# Patient Record
Sex: MALE
[De-identification: names, ages, dates, MRNs, and addresses within clinical notes are randomized per-mention and may not be internally consistent; named-entity substitution may affect disease eponyms.]

---

## 2024-09-03 ENCOUNTER — APPOINTMENT (OUTPATIENT)
Dept: ELECTROPHYSIOLOGY | Facility: CLINIC | Age: 59
End: 2024-09-03
Payer: COMMERCIAL

## 2024-09-03 VITALS
SYSTOLIC BLOOD PRESSURE: 127 MMHG | WEIGHT: 222 LBS | HEART RATE: 73 BPM | DIASTOLIC BLOOD PRESSURE: 83 MMHG | OXYGEN SATURATION: 97 % | BODY MASS INDEX: 31.08 KG/M2 | HEIGHT: 71 IN

## 2024-09-03 DIAGNOSIS — Z78.9 OTHER SPECIFIED HEALTH STATUS: ICD-10-CM

## 2024-09-03 DIAGNOSIS — Z82.49 FAMILY HISTORY OF ISCHEMIC HEART DISEASE AND OTHER DISEASES OF THE CIRCULATORY SYSTEM: ICD-10-CM

## 2024-09-03 DIAGNOSIS — Z00.00 ENCOUNTER FOR GENERAL ADULT MEDICAL EXAMINATION W/OUT ABNORMAL FINDINGS: ICD-10-CM

## 2024-09-03 DIAGNOSIS — I48.91 UNSPECIFIED ATRIAL FIBRILLATION: ICD-10-CM

## 2024-09-03 PROCEDURE — 99205 OFFICE O/P NEW HI 60 MIN: CPT | Mod: 25

## 2024-09-03 PROCEDURE — 93000 ELECTROCARDIOGRAM COMPLETE: CPT

## 2024-09-03 PROCEDURE — G2211 COMPLEX E/M VISIT ADD ON: CPT | Mod: NC

## 2024-09-03 RX ORDER — KRILL/OM-3/DHA/EPA/PHOSPHO/AST 1000-230MG
81 CAPSULE ORAL
Refills: 0 | Status: ACTIVE | COMMUNITY

## 2024-09-03 RX ORDER — CHLORTHALIDONE 50 MG/1
TABLET ORAL DAILY
Refills: 0 | Status: ACTIVE | COMMUNITY

## 2024-09-03 NOTE — CARDIOLOGY SUMMARY
[de-identified] : 9/3/2024 AFib with cont VR (HR 75 bpm), QTc 413 msec [de-identified] : per pt Ca score 15

## 2024-09-03 NOTE — DISCUSSION/SUMMARY
[EKG obtained to assist in diagnosis and management of assessed problem(s)] : EKG obtained to assist in diagnosis and management of assessed problem(s) [FreeTextEntry1] : I discussed with patient the different management strategies of atrial fibrillation including rate control, rhythm control and ablative therapy. I also discussed with the patient in great length the role of anticoagulation in stroke prevention. Patient expressed understanding of the discussion. Patient is interested in restoring sinus rhythm through DC cardioversion.   I explained that he will need a RORO prior to cardioversion to rule out thrombus. I discussed risks and benefits of DC cardioversion including risk of aspiration, skin burn, stroke, and cardiac arrest. He expressed understanding and would like to proceed with cardioversion. My office staff will contact patient the patient with date, time and instructions.

## 2024-09-03 NOTE — HISTORY OF PRESENT ILLNESS
[FreeTextEntry1] : Referring: Dr. Franz  57 yo M with no sig past medical history referred for evaluation of new onset AFib (detected incidentally). Patient states he has been under significant stress at home the last few months. He has two boys, one on the spectrum and the other with autism. He denies chest pain, dyspnea, palpitations, dizziness, lightheadedness, presyncope or syncope.

## 2024-09-03 NOTE — ASSESSMENT
[FreeTextEntry1] : # AFib - Currently on aspirin 81 mg for CHADS VASc 0 - Denies any symptoms but does admit to anxiety and is unsure if that can be from the heart. - Rec RORO/CV. Understands he will need 30 days of OAC after cardioversion.  - Advised pt to stop all herbal supplements including turmeric and saw palmetto - Recent labs reviewed. Electrolytes, hemoglobin, TFTs and LFTs stable - Obtain CCTA results and echo  I have also advised the patient to go to the nearest emergency room if he experiences any chest pain, dyspnea, syncope, or has any other compelling symptoms.  Follow up in 1 mo after cardioversion Plan discussed with Dr. Franz

## 2024-09-03 NOTE — PHYSICAL EXAM
[Well Developed] : well developed [Well Nourished] : well nourished [No Acute Distress] : no acute distress [Obese] : obese [Normal Conjunctiva] : normal conjunctiva [Normal S1, S2] : normal S1, S2 [No Murmur] : no murmur [Clear Lung Fields] : clear lung fields [Good Air Entry] : good air entry [No Respiratory Distress] : no respiratory distress  [Soft] : abdomen soft [Normal Gait] : normal gait [No Edema] : no edema [Moves all extremities] : moves all extremities [Normal Speech] : normal speech [Alert and Oriented] : alert and oriented [Normal memory] : normal memory [de-identified] : irregularly irregular rate and rhythm

## 2024-09-04 PROBLEM — I48.91 ATRIAL FIBRILLATION, UNSPECIFIED TYPE: Status: ACTIVE | Noted: 2024-09-03

## 2024-09-10 RX ORDER — ASPIRIN 81 MG
0 TABLET, DELAYED RELEASE (ENTERIC COATED) ORAL
Refills: 0 | DISCHARGE

## 2024-09-12 ENCOUNTER — RESULT REVIEW (OUTPATIENT)
Age: 59
End: 2024-09-12

## 2024-09-12 ENCOUNTER — OUTPATIENT (OUTPATIENT)
Dept: OUTPATIENT SERVICES | Facility: HOSPITAL | Age: 59
LOS: 1 days | Discharge: HOME | End: 2024-09-12
Payer: COMMERCIAL

## 2024-09-12 ENCOUNTER — NON-APPOINTMENT (OUTPATIENT)
Age: 59
End: 2024-09-12

## 2024-09-12 DIAGNOSIS — I48.19 OTHER PERSISTENT ATRIAL FIBRILLATION: ICD-10-CM

## 2024-09-12 DIAGNOSIS — I48.91 UNSPECIFIED ATRIAL FIBRILLATION: ICD-10-CM

## 2024-09-12 PROCEDURE — 93320 DOPPLER ECHO COMPLETE: CPT | Mod: 26

## 2024-09-12 PROCEDURE — 93320 DOPPLER ECHO COMPLETE: CPT

## 2024-09-12 PROCEDURE — 92960 CARDIOVERSION ELECTRIC EXT: CPT

## 2024-09-12 PROCEDURE — 93325 DOPPLER ECHO COLOR FLOW MAPG: CPT

## 2024-09-12 PROCEDURE — 93312 ECHO TRANSESOPHAGEAL: CPT

## 2024-09-12 PROCEDURE — 93312 ECHO TRANSESOPHAGEAL: CPT | Mod: 26,XU

## 2024-09-12 PROCEDURE — 93325 DOPPLER ECHO COLOR FLOW MAPG: CPT | Mod: 26

## 2024-09-12 RX ORDER — APIXABAN 5 MG/1
1 TABLET, FILM COATED ORAL
Qty: 60 | Refills: 0
Start: 2024-09-12

## 2024-09-12 RX ORDER — ENOXAPARIN SODIUM 100 MG/ML
100 INJECTION SUBCUTANEOUS ONCE
Refills: 0 | Status: COMPLETED | OUTPATIENT
Start: 2024-09-12 | End: 2024-09-12

## 2024-09-12 RX ADMIN — ENOXAPARIN SODIUM 100 MILLIGRAM(S): 100 INJECTION SUBCUTANEOUS at 10:50

## 2024-09-12 NOTE — ASU PATIENT PROFILE, ADULT - FALL HARM RISK - UNIVERSAL INTERVENTIONS
Bed in lowest position, wheels locked, appropriate side rails in place/Call bell, personal items and telephone in reach/Instruct patient to call for assistance before getting out of bed or chair/Non-slip footwear when patient is out of bed/[place] to call system/Physically safe environment - no spills, clutter or unnecessary equipment/Purposeful Proactive Rounding/Room/bathroom lighting operational, light cord in reach

## 2024-09-12 NOTE — H&P CARDIOLOGY - HISTORY OF PRESENT ILLNESS
57 yo M with no sig past medical history referred for evaluation of new onset AFib (detected incidentally). Patient states he has been under significant stress at home the last few months. He has two boys, one on the spectrum and the other with autism. He denies chest pain, dyspnea, palpitations, dizziness, lightheadedness, presyncope or syncope.     He has a CACS of 15 but no obstructive disease. No MI  No diabetes (5.7%)     CHADSVASC is 0. Is on aspirin 81mg daily.     He is not on AC but was told he has to be on AC after cardioversion.  59 yo M with no sig past medical history referred for evaluation of new onset AFib (detected incidentally). Patient states he has been under significant stress at home the last few months. He has two boys, one on the spectrum and the other with autism. He denies chest pain, dyspnea, palpitations, dizziness, lightheadedness, presyncope or syncope.     He has a CACS of 15 but no obstructive disease. No MI  No diabetes (5.7%)     CHADSVASC is 0. Is on aspirin 81mg daily.     He is not on AC but was told he has to be on AC after cardioversion.   He will be given 100mg of Lovenox before the procedure (he weighs 220 lbs and Creatinine 1.23 GFR is 69) and Eliquis 5mg BID will be sent to his pharmacy

## 2024-09-12 NOTE — H&P CARDIOLOGY - BIRTH SEX
Pharmacy sending fax for refill on:    atorvastatin (LIPITOR) 80 MG tablet  Take 1 tablet by mouth daily.    [facility] [place]      Male

## 2024-09-12 NOTE — CHART NOTE - NSCHARTNOTEFT_GEN_A_CORE
INDICATION:  - A fib     IMPRESSION:  - No NINI thrombus. Successful cardioversion to NSR.    - Normal velocities   - Normal LVEF     PROCEDURE: Transesophageal echocardiogram    Primary Physician: Dr. Richards  Assistant: Dr. Randolph    ANESTHESIA TYPE:   - As documented by anesthesia     CONDITION:  [  ] Good    ESTIMATED BLOOD LOSS: None    COMPLICATIONS: None    FINDINGS:    After risks and benefits of procedures were explained, informed consent was obtained and placed in chart. Refer to Anesthesia note for sedation details.  The RORO probe was passed into the esophagus without difficulty.  Transesophageal images were obtained.  The RORO probe was removed without difficulty and examined.  There was no evidence for bleeding.  The patient tolerated the procedure well without any immediate RORO-related complications.      Preliminary Findings:  LA: Normal.   NINI: Left atrial appendage was clear of clot and smoke.  LV: LVEF was estimated at 60%.   MV: Trace MR, no evidence of MS.   AV: No evidence of AI, normal opening.   RA: Normal  RV: Normal   TV: Trace TR.   PV: no PI.   IAS: no PFO. No R-> L shunt by color flow doppler.   Pericardial Effusion: None  Aorta: There was no atheroma seen in the thoracic aorta.     Patient successfully converted to sinus rhythm with synchronized 200J of direct current cardioversion. INDICATION:    - A fib     IMPRESSION:  - No NINI thrombus. Successful cardioversion to NSR.    - Normal velocities   - Normal LVEF     PROCEDURE: Transesophageal echocardiogram    Primary Physician: Dr. Richards  Assistant: Dr. Randolph    ANESTHESIA TYPE:   - As documented by anesthesia     CONDITION:  [  ] Good    ESTIMATED BLOOD LOSS: None    COMPLICATIONS: None    FINDINGS:    After risks and benefits of procedures were explained, informed consent was obtained and placed in chart. Refer to Anesthesia note for sedation details.  The RORO probe was passed into the esophagus without difficulty.  Transesophageal images were obtained.  The RORO probe was removed without difficulty and examined.  There was no evidence for bleeding.  The patient tolerated the procedure well without any immediate RORO-related complications.      Preliminary Findings:  LA: Normal.   NINI: Left atrial appendage was clear of clot and smoke.  LV: LVEF was estimated at 60%.   MV: Trace MR, no evidence of MS.   AV: No evidence of AI, normal opening.   RA: Normal  RV: Normal   TV: Trace TR.   PV: no PI.   IAS: no PFO. No R-> L shunt by color flow doppler.   Pericardial Effusion: None  Aorta: There was no atheroma seen in the thoracic aorta.     Patient successfully converted to sinus rhythm with synchronized 360J of direct current cardioversion.

## 2024-09-12 NOTE — PRE-ANESTHESIA EVALUATION ADULT - NSPROPOSEDPROCEDFT_GEN_ALL_CORE
What Type Of Note Output Would You Prefer (Optional)?: Standard Output
On A Scale Of 0 To 10 How Would You Rate Your Itching?: 7
How Did Your Itching Occur?: gradual in onset  (over a period of years)
How Severe Is Your Itching?: mild
RORO DCCV

## 2024-10-01 ENCOUNTER — APPOINTMENT (OUTPATIENT)
Dept: ELECTROPHYSIOLOGY | Facility: CLINIC | Age: 59
End: 2024-10-01
Payer: COMMERCIAL

## 2024-10-01 VITALS
HEART RATE: 84 BPM | WEIGHT: 222 LBS | BODY MASS INDEX: 31.08 KG/M2 | SYSTOLIC BLOOD PRESSURE: 140 MMHG | HEIGHT: 71 IN | DIASTOLIC BLOOD PRESSURE: 90 MMHG

## 2024-10-01 DIAGNOSIS — I48.91 UNSPECIFIED ATRIAL FIBRILLATION: ICD-10-CM

## 2024-10-01 PROCEDURE — G2211 COMPLEX E/M VISIT ADD ON: CPT | Mod: NC

## 2024-10-01 PROCEDURE — 93000 ELECTROCARDIOGRAM COMPLETE: CPT

## 2024-10-01 PROCEDURE — 99215 OFFICE O/P EST HI 40 MIN: CPT

## 2024-10-01 NOTE — ASSESSMENT
[FreeTextEntry1] : # Paroxysmal AFib - s/p RORO/CV 9/12/2024 with recurrent of AF - Currently on Eliquis 5 mg PO BID after recent cardioversion for 30 days. CHADS VASc 0 - Denies any symptoms but does admit to anxiety and is unsure if that can be from the heart. - Advised pt to stop all herbal supplements including turmeric and saw palmetto - Recent labs reviewed. Electrolytes, hemoglobin, TFTs and LFTs stable - Discussed various AF management strategies including DCCV, AAD, Ablation. Patient wishes to avoid AAD and would like to proceed with ablation.  - Going for RAYMUNDO eval in the next few weeks   I have also advised the patient to go to the nearest emergency room if he experiences any chest pain, dyspnea, syncope, or has any other compelling symptoms.  Plan discussed with Dr. Franz Follow up 2-3 weeks after ablation.

## 2024-10-01 NOTE — CARDIOLOGY SUMMARY
[de-identified] : 10/1/2024 AFib with cont VR (HR 84 bpm), iRBBB, QTc 444 msec 9/3/2024 AFib with cont VR (HR 75 bpm), QTc 413 msec [de-identified] : MPI normal per Dr. Franz [de-identified] : TTE 8/22/2024 EF 56%. LAE. Mild MR>  RORO/CV 9/12/2024 EF 55-60%. Normal biatrial size. Trace MR. No NINI thrombus and normal NINI velocities. Successful cardioversion with 360 J.  [de-identified] : 8/23/2024 Ca score 11.59.

## 2024-10-01 NOTE — HISTORY OF PRESENT ILLNESS
[FreeTextEntry1] : Referring: Dr. Franz  58 yo M with no sig past medical history referred for evaluation of new onset AFib (detected incidentally). Patient states he has been under significant stress at home the last few months. He has two boys, one on the spectrum and the other with autism. He denies chest pain, dyspnea, palpitations, dizziness, lightheadedness, presyncope or syncope.    10/1/2024 Here for follow up after cardioversion. He believes he felt better after cardioversion but cannot elaborate. He has cut down on caffeine intake. Denies chest pain, dizziness, lightheadedness, presyncope or syncope. Believes occ flutters may have been from anxiety but not sure. Wife states pt has been more easily irritable in the last 8 mo.

## 2024-10-01 NOTE — DISCUSSION/SUMMARY
[FreeTextEntry1] : We had an extensive conversation regarding the nature of atrial fibrillation, including potential etiologies, underlying pathophysiology and natural history of the disease. In addition, the potential risk of thromboembolic events and assessment of that risk were discussed. I have emphasized the importance of continuing anticoagulation.   In addition, the maintenance of sinus rhythm along with adjuvant antiarrhythmic agents and catheter ablation therapy were discussed. The rationale for and risks of ablation therapy were discussed, including but not limited to bleeding, vascular injury, groin complications, cardiac perforation and tamponade, stroke, esophageal injury, pulmonary vein stenosis, need for pacemaker, need for cardiac surgery, and death. In addition, the long-term and ongoing nature of this therapy were also discussed, including the critical role of continued monitoring post-ablation and the potential for the necessity of repeat ablation procedures to definitively treat the condition.   The patient verbalized understanding of the discussion and all questions were addressed and answered. The patient would like to proceed with ablation. [EKG obtained to assist in diagnosis and management of assessed problem(s)] : EKG obtained to assist in diagnosis and management of assessed problem(s)

## 2024-10-11 RX ORDER — APIXABAN 5 MG/1
5 TABLET, FILM COATED ORAL
Qty: 180 | Refills: 2 | Status: ACTIVE | COMMUNITY
Start: 1900-01-01 | End: 1900-01-01

## 2024-10-14 ENCOUNTER — APPOINTMENT (OUTPATIENT)
Dept: CARDIOLOGY | Facility: CLINIC | Age: 59
End: 2024-10-14

## 2024-10-14 VITALS
WEIGHT: 220 LBS | BODY MASS INDEX: 30.8 KG/M2 | SYSTOLIC BLOOD PRESSURE: 128 MMHG | HEART RATE: 90 BPM | HEIGHT: 71 IN | DIASTOLIC BLOOD PRESSURE: 88 MMHG

## 2024-10-14 PROCEDURE — 99204 OFFICE O/P NEW MOD 45 MIN: CPT

## 2024-10-28 PROBLEM — Z71.3 DIETARY COUNSELING: Status: ACTIVE | Noted: 2024-10-28

## 2024-10-28 PROBLEM — Z71.82 EXERCISE COUNSELING: Status: ACTIVE | Noted: 2024-10-28

## 2024-10-28 PROBLEM — Z71.89 COUNSELING ON HEALTH PROMOTION AND DISEASE PREVENTION: Status: ACTIVE | Noted: 2024-10-28

## 2024-11-04 ENCOUNTER — APPOINTMENT (OUTPATIENT)
Dept: SLEEP CENTER | Facility: HOSPITAL | Age: 59
End: 2024-11-04
Payer: COMMERCIAL

## 2024-11-04 ENCOUNTER — OUTPATIENT (OUTPATIENT)
Dept: OUTPATIENT SERVICES | Facility: HOSPITAL | Age: 59
LOS: 1 days | Discharge: HOME | End: 2024-11-04
Payer: COMMERCIAL

## 2024-11-04 PROCEDURE — 95800 SLP STDY UNATTENDED: CPT | Mod: 26

## 2024-11-04 PROCEDURE — 95800 SLP STDY UNATTENDED: CPT

## 2024-11-08 DIAGNOSIS — G47.33 OBSTRUCTIVE SLEEP APNEA (ADULT) (PEDIATRIC): ICD-10-CM

## 2024-11-09 DIAGNOSIS — G47.33 OBSTRUCTIVE SLEEP APNEA (ADULT) (PEDIATRIC): ICD-10-CM

## 2024-11-21 ENCOUNTER — RESULT REVIEW (OUTPATIENT)
Age: 59
End: 2024-11-21

## 2024-11-21 ENCOUNTER — OUTPATIENT (OUTPATIENT)
Dept: OUTPATIENT SERVICES | Facility: HOSPITAL | Age: 59
LOS: 1 days | End: 2024-11-21
Payer: COMMERCIAL

## 2024-11-21 VITALS
SYSTOLIC BLOOD PRESSURE: 146 MMHG | OXYGEN SATURATION: 100 % | RESPIRATION RATE: 18 BRPM | HEART RATE: 90 BPM | TEMPERATURE: 98 F | DIASTOLIC BLOOD PRESSURE: 90 MMHG | WEIGHT: 220.46 LBS | HEIGHT: 71 IN

## 2024-11-21 DIAGNOSIS — Z98.890 OTHER SPECIFIED POSTPROCEDURAL STATES: Chronic | ICD-10-CM

## 2024-11-21 DIAGNOSIS — Z90.89 ACQUIRED ABSENCE OF OTHER ORGANS: Chronic | ICD-10-CM

## 2024-11-21 DIAGNOSIS — Z01.818 ENCOUNTER FOR OTHER PREPROCEDURAL EXAMINATION: ICD-10-CM

## 2024-11-21 DIAGNOSIS — I48.19 OTHER PERSISTENT ATRIAL FIBRILLATION: ICD-10-CM

## 2024-11-21 LAB
ALBUMIN SERPL ELPH-MCNC: 4.2 G/DL — SIGNIFICANT CHANGE UP (ref 3.5–5.2)
ALP SERPL-CCNC: 72 U/L — SIGNIFICANT CHANGE UP (ref 30–115)
ALT FLD-CCNC: 23 U/L — SIGNIFICANT CHANGE UP (ref 0–41)
ANION GAP SERPL CALC-SCNC: 10 MMOL/L — SIGNIFICANT CHANGE UP (ref 7–14)
AST SERPL-CCNC: 19 U/L — SIGNIFICANT CHANGE UP (ref 0–41)
BASOPHILS # BLD AUTO: 0.06 K/UL — SIGNIFICANT CHANGE UP (ref 0–0.2)
BASOPHILS NFR BLD AUTO: 0.9 % — SIGNIFICANT CHANGE UP (ref 0–1)
BILIRUB SERPL-MCNC: 0.2 MG/DL — SIGNIFICANT CHANGE UP (ref 0.2–1.2)
BLD GP AB SCN SERPL QL: SIGNIFICANT CHANGE UP
BUN SERPL-MCNC: 18 MG/DL — SIGNIFICANT CHANGE UP (ref 10–20)
CALCIUM SERPL-MCNC: 9.1 MG/DL — SIGNIFICANT CHANGE UP (ref 8.4–10.5)
CHLORIDE SERPL-SCNC: 101 MMOL/L — SIGNIFICANT CHANGE UP (ref 98–110)
CO2 SERPL-SCNC: 27 MMOL/L — SIGNIFICANT CHANGE UP (ref 17–32)
CREAT SERPL-MCNC: 1 MG/DL — SIGNIFICANT CHANGE UP (ref 0.7–1.5)
EGFR: 88 ML/MIN/1.73M2 — SIGNIFICANT CHANGE UP
EOSINOPHIL # BLD AUTO: 0.16 K/UL — SIGNIFICANT CHANGE UP (ref 0–0.7)
EOSINOPHIL NFR BLD AUTO: 2.3 % — SIGNIFICANT CHANGE UP (ref 0–8)
GLUCOSE SERPL-MCNC: 92 MG/DL — SIGNIFICANT CHANGE UP (ref 70–99)
HCT VFR BLD CALC: 43.3 % — SIGNIFICANT CHANGE UP (ref 42–52)
HGB BLD-MCNC: 14.5 G/DL — SIGNIFICANT CHANGE UP (ref 14–18)
IMM GRANULOCYTES NFR BLD AUTO: 0.3 % — SIGNIFICANT CHANGE UP (ref 0.1–0.3)
LYMPHOCYTES # BLD AUTO: 1.5 K/UL — SIGNIFICANT CHANGE UP (ref 1.2–3.4)
LYMPHOCYTES # BLD AUTO: 21.3 % — SIGNIFICANT CHANGE UP (ref 20.5–51.1)
MCHC RBC-ENTMCNC: 30.5 PG — SIGNIFICANT CHANGE UP (ref 27–31)
MCHC RBC-ENTMCNC: 33.5 G/DL — SIGNIFICANT CHANGE UP (ref 32–37)
MCV RBC AUTO: 91.2 FL — SIGNIFICANT CHANGE UP (ref 80–94)
MONOCYTES # BLD AUTO: 0.57 K/UL — SIGNIFICANT CHANGE UP (ref 0.1–0.6)
MONOCYTES NFR BLD AUTO: 8.1 % — SIGNIFICANT CHANGE UP (ref 1.7–9.3)
NEUTROPHILS # BLD AUTO: 4.73 K/UL — SIGNIFICANT CHANGE UP (ref 1.4–6.5)
NEUTROPHILS NFR BLD AUTO: 67.1 % — SIGNIFICANT CHANGE UP (ref 42.2–75.2)
NRBC # BLD: 0 /100 WBCS — SIGNIFICANT CHANGE UP (ref 0–0)
PLATELET # BLD AUTO: 328 K/UL — SIGNIFICANT CHANGE UP (ref 130–400)
PMV BLD: 10.2 FL — SIGNIFICANT CHANGE UP (ref 7.4–10.4)
POTASSIUM SERPL-MCNC: 4.1 MMOL/L — SIGNIFICANT CHANGE UP (ref 3.5–5)
POTASSIUM SERPL-SCNC: 4.1 MMOL/L — SIGNIFICANT CHANGE UP (ref 3.5–5)
PROT SERPL-MCNC: 6.3 G/DL — SIGNIFICANT CHANGE UP (ref 6–8)
RBC # BLD: 4.75 M/UL — SIGNIFICANT CHANGE UP (ref 4.7–6.1)
RBC # FLD: 13.1 % — SIGNIFICANT CHANGE UP (ref 11.5–14.5)
SODIUM SERPL-SCNC: 138 MMOL/L — SIGNIFICANT CHANGE UP (ref 135–146)
WBC # BLD: 7.04 K/UL — SIGNIFICANT CHANGE UP (ref 4.8–10.8)
WBC # FLD AUTO: 7.04 K/UL — SIGNIFICANT CHANGE UP (ref 4.8–10.8)

## 2024-11-21 PROCEDURE — 85025 COMPLETE CBC W/AUTO DIFF WBC: CPT

## 2024-11-21 PROCEDURE — 36415 COLL VENOUS BLD VENIPUNCTURE: CPT

## 2024-11-21 PROCEDURE — 71046 X-RAY EXAM CHEST 2 VIEWS: CPT | Mod: 26

## 2024-11-21 PROCEDURE — 93010 ELECTROCARDIOGRAM REPORT: CPT

## 2024-11-21 PROCEDURE — 93005 ELECTROCARDIOGRAM TRACING: CPT

## 2024-11-21 PROCEDURE — 86900 BLOOD TYPING SEROLOGIC ABO: CPT

## 2024-11-21 PROCEDURE — 86901 BLOOD TYPING SEROLOGIC RH(D): CPT

## 2024-11-21 PROCEDURE — 86850 RBC ANTIBODY SCREEN: CPT

## 2024-11-21 PROCEDURE — 80053 COMPREHEN METABOLIC PANEL: CPT

## 2024-11-21 PROCEDURE — 99214 OFFICE O/P EST MOD 30 MIN: CPT | Mod: 25

## 2024-11-21 PROCEDURE — 71046 X-RAY EXAM CHEST 2 VIEWS: CPT

## 2024-11-21 NOTE — H&P PST ADULT - HISTORY OF PRESENT ILLNESS
Patient is a 59  year old male presenting to [facility] in preparation for AF ABLATION RORO PFA and   RORO/CARDIOVERSION on 12/5 under gen  anesthesia by Dr. Boothe/ Susie  Pt was seen by cardio for new onset of a-fib,  Patient is interested in restoring sinus rhythm , Scheduled to have above  ?  PATIENT CURRENTLY DENIES CHEST PAIN  SHORTNESS OF BREATH  PALPITATIONS,  DYSURIA, OR UPPER RESPIRATORY INFECTION IN PAST 2 WEEKS    Anesthesia Alert  NO--Difficult Airway  NO--History of neck surgery or radiation  NO--Limited ROM of neck  NO--History of Malignant hyperthermia  NO--Personal or family history of Pseudocholinesterase deficiency  NO--Prior Anesthesia Complication  NO--Latex Allergy  NO--Loose teeth  NO--History of Rheumatoid Arthritis  NO--RAYMUNDO  NO-- BLEEDING RISK  NO--Other_____    As per patient, this is their complete medical and surgical history, including medications both prescribed or over the counter.  Patient verbalized understanding of instructions and was given the opportunity to ask questions and have them answered.   Patient is a 59  year old male presenting to [facility]ST in preparation for AF ABLATION RORO PFA and   RORO/CARDIOVERSION on 12/5 under gen  anesthesia by Dr. Boothe/ Susie    Pt was seen by cardio for new onset of a-fib,  Patient is interested in restoring sinus rhythm , Scheduled to have above  ?  PATIENT CURRENTLY DENIES CHEST PAIN  SHORTNESS OF BREATH  PALPITATIONS,  DYSURIA, OR UPPER RESPIRATORY INFECTION IN PAST 2 WEEKS    Anesthesia Alert  NO--Difficult Airway  NO--History of neck surgery or radiation  NO--Limited ROM of neck  NO--History of Malignant hyperthermia  NO--Personal or family history of Pseudocholinesterase deficiency  NO--Prior Anesthesia Complication  NO--Latex Allergy  NO--Loose teeth  NO--History of Rheumatoid Arthritis  NO--RAYMUNDO  NO-- BLEEDING RISK  NO--Other_____      [facility] Activity Status Index (DASI) from [employer]  on 11/21/2024      RESULT SUMMARY:  58.2 points  The higher the score (maximum 58.2), the higher the functional status.    9.89 METs        INPUTS:  Take care of self —> 2.75 = Yes  Walk indoors —> 1.75 = Yes  Walk 1&ndash;2 blocks on level ground —> 2.75 = Yes  Climb a flight of stairs or walk up a hill —> 5.5 = Yes  Run a short distance —> 8 = Yes  Do light work around the house —> 2.7 = Yes  Do moderate work around the house —> 3.5 = Yes  Do heavy work around the house —> 8 = Yes  Do yardwork —> 4.5 = Yes  Have sexual relations —> 5.25 = Yes  Participate in moderate recreational activities —> 6 = Yes  Participate in strenuous sports —> 7.5 = Yes          Revised Cardiac Risk Index for Pre-Operative Risk from [employer]  on 11/21/2024      RESULT SUMMARY:  0 points  RCRI Score    3.9 %  Risk of major cardiac event      INPUTS:  High-risk surgery —> 0 = No  History of ischemic heart disease —> 0 = No  History of congestive heart failure —> 0 = No  History of cerebrovascular disease —> 0 = No  Pre-operative treatment with insulin —> 0 = No  Pre-operative creatinine >2 mg/dL / 176.8 µmol/L —> 0 = No      As per patient, this is their complete medical and surgical history, including medications both prescribed or over the counter.  Patient verbalized understanding of instructions and was given the opportunity to ask questions and have them answered.

## 2024-11-21 NOTE — H&P PST ADULT - NSICDXPASTSURGICALHX_GEN_ALL_CORE_FT
PAST SURGICAL HISTORY:  H/O knee surgery     History of hernia repair     History of tonsillectomy     S/P nasal surgery

## 2024-11-21 NOTE — H&P PST ADULT - NSICDXFAMILYHX_GEN_ALL_CORE_FT
FAMILY HISTORY:  Father  Still living? No  FH: HTN (hypertension), Age at diagnosis: Age Unknown    Mother  Still living? No  FH: CAD (coronary artery disease), Age at diagnosis: Age Unknown

## 2024-11-22 DIAGNOSIS — I48.19 OTHER PERSISTENT ATRIAL FIBRILLATION: ICD-10-CM

## 2024-11-22 DIAGNOSIS — Z01.818 ENCOUNTER FOR OTHER PREPROCEDURAL EXAMINATION: ICD-10-CM

## 2024-11-27 ENCOUNTER — APPOINTMENT (OUTPATIENT)
Dept: PULMONOLOGY | Facility: CLINIC | Age: 59
End: 2024-11-27
Payer: COMMERCIAL

## 2024-11-27 DIAGNOSIS — R06.02 SHORTNESS OF BREATH: ICD-10-CM

## 2024-11-27 DIAGNOSIS — G47.33 OBSTRUCTIVE SLEEP APNEA (ADULT) (PEDIATRIC): ICD-10-CM

## 2024-11-27 PROCEDURE — 99213 OFFICE O/P EST LOW 20 MIN: CPT

## 2024-12-05 ENCOUNTER — TRANSCRIPTION ENCOUNTER (OUTPATIENT)
Age: 59
End: 2024-12-05

## 2024-12-05 ENCOUNTER — RESULT REVIEW (OUTPATIENT)
Age: 59
End: 2024-12-05

## 2024-12-05 ENCOUNTER — APPOINTMENT (OUTPATIENT)
Dept: ELECTROPHYSIOLOGY | Facility: HOSPITAL | Age: 59
End: 2024-12-05

## 2024-12-05 ENCOUNTER — INPATIENT (INPATIENT)
Facility: HOSPITAL | Age: 59
LOS: 0 days | Discharge: HOME | DRG: 310 | End: 2024-12-06
Attending: STUDENT IN AN ORGANIZED HEALTH CARE EDUCATION/TRAINING PROGRAM | Admitting: STUDENT IN AN ORGANIZED HEALTH CARE EDUCATION/TRAINING PROGRAM
Payer: COMMERCIAL

## 2024-12-05 VITALS
DIASTOLIC BLOOD PRESSURE: 99 MMHG | RESPIRATION RATE: 16 BRPM | TEMPERATURE: 98 F | WEIGHT: 242.51 LBS | HEIGHT: 70.87 IN | SYSTOLIC BLOOD PRESSURE: 173 MMHG | HEART RATE: 77 BPM | OXYGEN SATURATION: 96 %

## 2024-12-05 DIAGNOSIS — Z98.890 OTHER SPECIFIED POSTPROCEDURAL STATES: Chronic | ICD-10-CM

## 2024-12-05 DIAGNOSIS — I48.19 OTHER PERSISTENT ATRIAL FIBRILLATION: ICD-10-CM

## 2024-12-05 DIAGNOSIS — Z90.89 ACQUIRED ABSENCE OF OTHER ORGANS: Chronic | ICD-10-CM

## 2024-12-05 LAB — BLD GP AB SCN SERPL QL: SIGNIFICANT CHANGE UP

## 2024-12-05 PROCEDURE — 93656 COMPRE EP EVAL ABLTJ ATR FIB: CPT

## 2024-12-05 PROCEDURE — 80048 BASIC METABOLIC PNL TOTAL CA: CPT

## 2024-12-05 PROCEDURE — 93657 TX L/R ATRIAL FIB ADDL: CPT

## 2024-12-05 PROCEDURE — 93325 DOPPLER ECHO COLOR FLOW MAPG: CPT | Mod: 26

## 2024-12-05 PROCEDURE — 93005 ELECTROCARDIOGRAM TRACING: CPT

## 2024-12-05 PROCEDURE — 93320 DOPPLER ECHO COMPLETE: CPT | Mod: 26

## 2024-12-05 PROCEDURE — 36415 COLL VENOUS BLD VENIPUNCTURE: CPT

## 2024-12-05 PROCEDURE — 93312 ECHO TRANSESOPHAGEAL: CPT | Mod: 26

## 2024-12-05 PROCEDURE — 93306 TTE W/DOPPLER COMPLETE: CPT | Mod: 26

## 2024-12-05 RX ORDER — PANTOPRAZOLE SODIUM 40 MG/1
40 TABLET, DELAYED RELEASE ORAL
Refills: 0 | Status: DISCONTINUED | OUTPATIENT
Start: 2024-12-06 | End: 2024-12-06

## 2024-12-05 RX ORDER — FAMOTIDINE 20 MG/1
20 TABLET, FILM COATED ORAL ONCE
Refills: 0 | Status: COMPLETED | OUTPATIENT
Start: 2024-12-05 | End: 2024-12-05

## 2024-12-05 RX ORDER — APIXABAN 2.5 MG/1
5 TABLET, FILM COATED ORAL EVERY 12 HOURS
Refills: 0 | Status: DISCONTINUED | OUTPATIENT
Start: 2024-12-05 | End: 2024-12-06

## 2024-12-05 RX ORDER — ACETAMINOPHEN 500MG 500 MG/1
650 TABLET, COATED ORAL EVERY 6 HOURS
Refills: 0 | Status: DISCONTINUED | OUTPATIENT
Start: 2024-12-05 | End: 2024-12-06

## 2024-12-05 RX ORDER — PANTOPRAZOLE SODIUM 40 MG/1
40 TABLET, DELAYED RELEASE ORAL ONCE
Refills: 0 | Status: COMPLETED | OUTPATIENT
Start: 2024-12-05 | End: 2024-12-05

## 2024-12-05 RX ADMIN — PANTOPRAZOLE SODIUM 40 MILLIGRAM(S): 40 TABLET, DELAYED RELEASE ORAL at 13:14

## 2024-12-05 RX ADMIN — APIXABAN 5 MILLIGRAM(S): 2.5 TABLET, FILM COATED ORAL at 13:13

## 2024-12-05 RX ADMIN — FAMOTIDINE 20 MILLIGRAM(S): 20 TABLET, FILM COATED ORAL at 21:32

## 2024-12-05 RX ADMIN — ACETAMINOPHEN 500MG 650 MILLIGRAM(S): 500 TABLET, COATED ORAL at 21:32

## 2024-12-05 NOTE — PROGRESS NOTE ADULT - SUBJECTIVE AND OBJECTIVE BOX
Electrophysiology Brief Post-Op Note    I have personally seen and examined the patient.  I agree with the history and physical which I have reviewed and noted any changes below.  10-03-24 @ 8:40 AM    PRE-OP DIAGNOSIS: Paroxysmal AFib    POST-OP DIAGNOSIS: Paroxysmal AFib    PROCEDURE:   - RORO   - PFA AFib Ablation (PVI, Post Wall) - 60 applications     VASCULAR ACCESS (with ultrasound guidance)   - Right femoral vein: 16.8 Fr   - Left femoral vein: 8 Fr, 11 Fr long   - Right femoral artery: none    Physician: Christiana Boothe MD  Assistant: None    ANESTHESIA TYPE:  [ X ] General Anesthesia  [  ] Sedation  [ X ] Local/Regional    ESTIMATED BLOOD LOSS:     20 mL    CONDITION  [  ] Critical  [  ] Serious  [  ]Fair  [ X ]Good    SPECIMENS REMOVED (IF APPLICABLE): N/A    IMPLANTS (IF APPLICABLE): N/A    FINDINGS: Paroxysmal AFib     COMPLICATIONS: None    PLAN OF CARE  -           Admit to telemetry  -           Check BMP in AM  -	Resume Eliquis 5 mg BID  -	Start Protonix 40 mg daily tonight x 1 month  -	Bedrest x 2 hours (perclose to groins)  -          Follow up with me in the office in 2-3 weeks as scheduled

## 2024-12-05 NOTE — CHART NOTE - NSCHARTNOTEFT_GEN_A_CORE
PACU ANESTHESIA ADMISSION NOTE      Procedure: RORO, atrial fibrillation ablation  Post op diagnosis:  atrial fibrillation    ____  Intubated  TV:______       Rate: ______      FiO2: ______    __x__  Patent Airway    __x__  Full return of protective reflexes    __x__  Full recovery from anesthesia / back to baseline     Vitals:   T:   36.5        R:  14                BP:   123/76               Sat: 100                  P: 96      Mental Status:  _x___ Awake   __x___ Alert   _____ Drowsy   _____ Sedated    Nausea/Vomiting:  _x___ NO  ______Yes,   See Post - Op Orders          Pain Scale (0-10):  _____    Treatment: ____ None    ___x_ See Post - Op/PCA Orders    Post - Operative Fluids:   ____ Oral   __x__ See Post - Op Orders    Plan: Discharge:   ____Home       __x___Floor     _____Critical Care    _____  Other:_________________    Comments:

## 2024-12-05 NOTE — PRE PROCEDURE NOTE - PRE PROCEDURE EVALUATION
I have personally seen and examined the patient. I agree with the history and physical//consult//progress note, which I have reviewed and noted any changes below.     Pre-op Diagnosis: Paroxysmal AFib     Procedure: RORO, AFib Ablation and All Related Procedures

## 2024-12-05 NOTE — DISCHARGE NOTE PROVIDER - NSDCFUSCHEDAPPT_GEN_ALL_CORE_FT
Christiana Boothe  [facility]  ELECTROPH [id number]  Scheduled Appointment: 01/07/2025

## 2024-12-05 NOTE — DISCHARGE NOTE PROVIDER - HOSPITAL COURSE
Patient is a 59y Male  with PMHx of paroxysmal afib (s/p DCCV in 9/2024), HLD, pre-DM who presented to [facility] for elective AF Ablation. On 12/6 patient underwent successful PFA for afib. Patient was monitored overnight. On POD 1 patient remains HD stable with no complaints. Patient remains in SR with no arrhythmias noted on tele. Examination of B/L common femoral venous access sites reveal a Clear and dry area with no hematoma or erythema.  For PVI- Patient should continue Eliquis for thromboembolic prophylaxis. Patient is being DC home in stable condition.

## 2024-12-05 NOTE — DISCHARGE NOTE PROVIDER - CARE PROVIDER_API CALL
Christiana Boothe  Cardiovascular Disease  [address] [place] [id number]  Phone: [phone]  Fax: [phone]  Follow Up Time: 1 month

## 2024-12-05 NOTE — PATIENT PROFILE ADULT - FALL HARM RISK - UNIVERSAL INTERVENTIONS
Bed in lowest position, wheels locked, appropriate side rails in place/Call bell, personal items and telephone in reach/Instruct patient to call for assistance before getting out of bed or chair/Non-slip footwear when patient is out of bed/[facility] to call system/Physically safe environment - no spills, clutter or unnecessary equipment/Purposeful Proactive Rounding/Room/bathroom lighting operational, light cord in reach

## 2024-12-05 NOTE — DISCHARGE NOTE PROVIDER - NSDCCPTREATMENT_GEN_ALL_CORE_FT
PRINCIPAL PROCEDURE  Procedure: Pulmonary vein isolation for atrial fibrillation  Findings and Treatment: - Please start taking pantoprazole 40 mg daily for 30 days.  - You should continue your Eliquis.  - You may shower today.  - Do not drive or operate heavy machinery for 3 days.  - Do not submerge in water (example: baths, swimming) for 1 week.  - No squatting, exertional activities, or lifting anything > 10 lbs for 1 week.  - Any sudden swelling, redness, fever, discharge, or severe pain, call the Electrophysiology Office at [phone].

## 2024-12-05 NOTE — DISCHARGE NOTE PROVIDER - NSDCMRMEDTOKEN_GEN_ALL_CORE_FT
Eliquis 5 mg oral tablet: 1 tab(s) orally every 12 hours   Eliquis 5 mg oral tablet: 1 tab(s) orally every 12 hours  pantoprazole 40 mg oral delayed release tablet: 1 tab(s) orally once a day (before a meal)

## 2024-12-06 ENCOUNTER — TRANSCRIPTION ENCOUNTER (OUTPATIENT)
Age: 59
End: 2024-12-06

## 2024-12-06 VITALS
SYSTOLIC BLOOD PRESSURE: 120 MMHG | OXYGEN SATURATION: 97 % | HEART RATE: 92 BPM | TEMPERATURE: 99 F | RESPIRATION RATE: 18 BRPM | DIASTOLIC BLOOD PRESSURE: 79 MMHG

## 2024-12-06 LAB
ANION GAP SERPL CALC-SCNC: 8 MMOL/L — SIGNIFICANT CHANGE UP (ref 7–14)
BUN SERPL-MCNC: 19 MG/DL — SIGNIFICANT CHANGE UP (ref 10–20)
CALCIUM SERPL-MCNC: 8.8 MG/DL — SIGNIFICANT CHANGE UP (ref 8.4–10.4)
CHLORIDE SERPL-SCNC: 105 MMOL/L — SIGNIFICANT CHANGE UP (ref 98–110)
CO2 SERPL-SCNC: 28 MMOL/L — SIGNIFICANT CHANGE UP (ref 17–32)
CREAT SERPL-MCNC: 1 MG/DL — SIGNIFICANT CHANGE UP (ref 0.7–1.5)
EGFR: 88 ML/MIN/1.73M2 — SIGNIFICANT CHANGE UP
GLUCOSE SERPL-MCNC: 108 MG/DL — HIGH (ref 70–99)
POTASSIUM SERPL-MCNC: 4.2 MMOL/L — SIGNIFICANT CHANGE UP (ref 3.5–5)
POTASSIUM SERPL-SCNC: 4.2 MMOL/L — SIGNIFICANT CHANGE UP (ref 3.5–5)
SODIUM SERPL-SCNC: 141 MMOL/L — SIGNIFICANT CHANGE UP (ref 135–146)

## 2024-12-06 PROCEDURE — 93010 ELECTROCARDIOGRAM REPORT: CPT

## 2024-12-06 PROCEDURE — 99239 HOSP IP/OBS DSCHRG MGMT >30: CPT

## 2024-12-06 PROCEDURE — 99232 SBSQ HOSP IP/OBS MODERATE 35: CPT

## 2024-12-06 RX ORDER — PANTOPRAZOLE SODIUM 40 MG/1
1 TABLET, DELAYED RELEASE ORAL
Qty: 30 | Refills: 0
Start: 2024-12-06 | End: 2025-01-04

## 2024-12-06 RX ADMIN — ACETAMINOPHEN 500MG 650 MILLIGRAM(S): 500 TABLET, COATED ORAL at 09:59

## 2024-12-06 RX ADMIN — ACETAMINOPHEN 500MG 650 MILLIGRAM(S): 500 TABLET, COATED ORAL at 03:39

## 2024-12-06 RX ADMIN — PANTOPRAZOLE SODIUM 40 MILLIGRAM(S): 40 TABLET, DELAYED RELEASE ORAL at 05:55

## 2024-12-06 RX ADMIN — ACETAMINOPHEN 500MG 650 MILLIGRAM(S): 500 TABLET, COATED ORAL at 04:39

## 2024-12-06 RX ADMIN — APIXABAN 5 MILLIGRAM(S): 2.5 TABLET, FILM COATED ORAL at 05:53

## 2024-12-06 RX ADMIN — ACETAMINOPHEN 500MG 650 MILLIGRAM(S): 500 TABLET, COATED ORAL at 11:01

## 2024-12-06 NOTE — DISCHARGE NOTE NURSING/CASE MANAGEMENT/SOCIAL WORK - FINANCIAL ASSISTANCE
[facility] provides services at a reduced cost to those who are determined to be eligible through [facility]’s financial assistance program. Information regarding [facility]’s financial assistance program can be found by going to [email or web address] or by calling [phone].

## 2024-12-06 NOTE — PROGRESS NOTE ADULT - NS ATTEND AMEND GEN_ALL_CORE FT
s/p AF ablation (PVI, post wall, PFA)  Groins healing well  No events on tele  Cont Eliquis 5 mg po bid uninterrupted  Follow up in 3-4 weeks

## 2024-12-06 NOTE — DISCHARGE NOTE NURSING/CASE MANAGEMENT/SOCIAL WORK - NSDCPETBCESMAN_GEN_ALL_CORE
If you are a smoker, it is important for your health to stop smoking. Please be aware that second hand smoke is also harmful. Patient was seen in [facility] - provided with PCP information - will call to schedule appointment to establish care.

## 2024-12-06 NOTE — PROGRESS NOTE ADULT - SUBJECTIVE AND OBJECTIVE BOX
INTERVAL HPI/OVERNIGHT EVENTS:    Patient s/p     AF        ablation.   No events over night  Patient in NSR    MEDICATIONS  (STANDING):  apixaban 5 milliGRAM(s) Oral every 12 hours  pantoprazole    Tablet 40 milliGRAM(s) Oral before breakfast    MEDICATIONS  (PRN):  acetaminophen     Tablet .. 650 milliGRAM(s) Oral every 6 hours PRN Moderate Pain (4 - 6)      Allergies    No Known Allergies    Intolerances          Vital Signs Last 24 Hrs  T(C): 37.1 (06 Dec 2024 09:05), Max: 37.3 (05 Dec 2024 23:59)  T(F): 98.7 (06 Dec 2024 09:05), Max: 99.2 (05 Dec 2024 23:59)  HR: 92 (06 Dec 2024 09:05) (84 - 100)  BP: 120/79 (06 Dec 2024 09:05) (114/60 - 143/92)  BP(mean): 95 (06 Dec 2024 09:05) (81 - 112)  RR: 18 (06 Dec 2024 09:05) (11 - 20)  SpO2: 97% (06 Dec 2024 09:05) (95% - 100%)    Parameters below as of 06 Dec 2024 09:05  Patient On (Oxygen Delivery Method): room air        GENERAL: In no apparent distress, well nourished, and hydrated.  HEART: Regular rate and rhythm; No murmurs, rubs, or gallops.  PULMONARY: Clear to auscultation and perfusion.  No rales, wheezing, or rhonchi bilaterally.  ABDOMEN: Soft, Nontender, Nondistended; Bowel sounds present  EXTREMITIES:  2+ Peripheral Pulses, No clubbing, cyanosis, or edema  groins No hematoma, no bleeding; stiches removed  NEUROLOGICAL: Grossly nonfocal    LABS:        141  |  105  |  19  ----------------------------<  108[H]  4.2   |  28  |  1.0    Ca    8.8      06 Dec 2024 05:58        Urinalysis Basic - ( 06 Dec 2024 05:58 )    Color: x / Appearance: x / SG: x / pH: x  Gluc: 108 mg/dL / Ketone: x  / Bili: x / Urobili: x   Blood: x / Protein: x / Nitrite: x   Leuk Esterase: x / RBC: x / WBC x   Sq Epi: x / Non Sq Epi: x / Bacteria: x        EK Lead ECG:   Ventricular Rate 84 BPM    Atrial Rate 84 BPM    P-R Interval 186 ms    QRS Duration 104 ms    Q-T Interval 380 ms    QTC Calculation(Bazett) 449 ms    P Axis 56 degrees    R Axis -13 degrees    T Axis 47 degrees    Diagnosis Line Normal sinus rhythm  Low voltage QRS  Incomplete right bundle branch block  Borderline ECG    Confirmed by EMILIO WANG MD ([id number]) on 2024 8:46:51 AM (24 @ 07:36)      A/P  Patient S/P   AF   Ablation  Patient is doing well  - continue Eliquis   DO NOT SKIP A SINGLE DOSE FOR THE NEXT 3 MONTH  - protonix 40 mg daily x 30 days  - No heavy lifting or exertional activities for 10days  - Can take a shower, no bathtub for 10days, do not submerge yourself in water  - FU in EP office Dr [place]      INTERVAL HPI/OVERNIGHT EVENTS:    Patient s/p AF  ablation.   No events over night  Patient in NSR    MEDICATIONS  (STANDING):  apixaban 5 milliGRAM(s) Oral every 12 hours  pantoprazole    Tablet 40 milliGRAM(s) Oral before breakfast    MEDICATIONS  (PRN):  acetaminophen     Tablet .. 650 milliGRAM(s) Oral every 6 hours PRN Moderate Pain (4 - 6)      Allergies    No Known Allergies    Intolerances          Vital Signs Last 24 Hrs  T(C): 37.1 (06 Dec 2024 09:05), Max: 37.3 (05 Dec 2024 23:59)  T(F): 98.7 (06 Dec 2024 09:05), Max: 99.2 (05 Dec 2024 23:59)  HR: 92 (06 Dec 2024 09:05) (84 - 100)  BP: 120/79 (06 Dec 2024 09:05) (114/60 - 143/92)  BP(mean): 95 (06 Dec 2024 09:05) (81 - 112)  RR: 18 (06 Dec 2024 09:05) (11 - 20)  SpO2: 97% (06 Dec 2024 09:05) (95% - 100%)    Parameters below as of 06 Dec 2024 09:05  Patient On (Oxygen Delivery Method): room air        GENERAL: In no apparent distress, well nourished, and hydrated.  HEART: Regular rate and rhythm; No murmurs, rubs, or gallops.  PULMONARY: Clear to auscultation and perfusion.  No rales, wheezing, or rhonchi bilaterally.  ABDOMEN: Soft, Nontender, Nondistended; Bowel sounds present  EXTREMITIES:  2+ Peripheral Pulses, No clubbing, cyanosis, or edema  groins No hematoma, no bleeding  NEUROLOGICAL: Grossly nonfocal    LABS:        141  |  105  |  19  ----------------------------<  108[H]  4.2   |  28  |  1.0    Ca    8.8      06 Dec 2024 05:58        Urinalysis Basic - ( 06 Dec 2024 05:58 )    Color: x / Appearance: x / SG: x / pH: x  Gluc: 108 mg/dL / Ketone: x  / Bili: x / Urobili: x   Blood: x / Protein: x / Nitrite: x   Leuk Esterase: x / RBC: x / WBC x   Sq Epi: x / Non Sq Epi: x / Bacteria: x        EK Lead ECG:   Ventricular Rate 84 BPM    Atrial Rate 84 BPM    P-R Interval 186 ms    QRS Duration 104 ms    Q-T Interval 380 ms    QTC Calculation(Bazett) 449 ms    P Axis 56 degrees    R Axis -13 degrees    T Axis 47 degrees    Diagnosis Line Normal sinus rhythm  Low voltage QRS  Incomplete right bundle branch block  Borderline ECG    Confirmed by EMILIO WANG MD ([id number]) on 2024 8:46:51 AM (24 @ 07:36)      A/P  Patient S/P   AF   Ablation  Patient is doing well  - continue Eliquis   DO NOT SKIP A SINGLE DOSE FOR THE NEXT 3 MONTH  - protonix 40 mg daily x 30 days  - No heavy lifting or exertional activities for 10days  - Can take a shower, no bathtub for 10days, do not submerge yourself in water  - FU in EP office Dr [place]

## 2024-12-06 NOTE — DISCHARGE NOTE NURSING/CASE MANAGEMENT/SOCIAL WORK - PATIENT PORTAL LINK FT
You can access the FollowMyHealth Patient Portal offered by [facility] by registering at the following website: [email or web address]. By joining [employer]’s FollowMyHealth portal, you will also be able to view your health information using other applications (apps) compatible with our system.

## 2024-12-11 DIAGNOSIS — I45.10 UNSPECIFIED RIGHT BUNDLE-BRANCH BLOCK: ICD-10-CM

## 2024-12-11 DIAGNOSIS — Z79.01 LONG TERM (CURRENT) USE OF ANTICOAGULANTS: ICD-10-CM

## 2024-12-11 DIAGNOSIS — I48.0 PAROXYSMAL ATRIAL FIBRILLATION: ICD-10-CM

## 2024-12-11 DIAGNOSIS — Z86.79 PERSONAL HISTORY OF OTHER DISEASES OF THE CIRCULATORY SYSTEM: ICD-10-CM

## 2024-12-11 DIAGNOSIS — E78.5 HYPERLIPIDEMIA, UNSPECIFIED: ICD-10-CM

## 2025-01-06 PROBLEM — I48.0 PAROXYSMAL ATRIAL FIBRILLATION: Status: ACTIVE | Noted: 2025-01-06

## 2025-01-06 PROBLEM — I48.91 ATRIAL FIBRILLATION, UNSPECIFIED TYPE: Noted: 2024-09-03

## 2025-01-07 ENCOUNTER — APPOINTMENT (OUTPATIENT)
Dept: ELECTROPHYSIOLOGY | Facility: CLINIC | Age: 60
End: 2025-01-07
Payer: COMMERCIAL

## 2025-01-07 VITALS
HEART RATE: 80 BPM | HEIGHT: 71 IN | DIASTOLIC BLOOD PRESSURE: 90 MMHG | BODY MASS INDEX: 30.8 KG/M2 | WEIGHT: 220 LBS | SYSTOLIC BLOOD PRESSURE: 155 MMHG

## 2025-01-07 DIAGNOSIS — I48.91 UNSPECIFIED ATRIAL FIBRILLATION: ICD-10-CM

## 2025-01-07 DIAGNOSIS — I48.0 PAROXYSMAL ATRIAL FIBRILLATION: ICD-10-CM

## 2025-01-07 PROCEDURE — 93000 ELECTROCARDIOGRAM COMPLETE: CPT

## 2025-01-07 PROCEDURE — G2211 COMPLEX E/M VISIT ADD ON: CPT | Mod: NC

## 2025-01-07 PROCEDURE — 99214 OFFICE O/P EST MOD 30 MIN: CPT

## 2025-03-04 ENCOUNTER — APPOINTMENT (OUTPATIENT)
Dept: ELECTROPHYSIOLOGY | Facility: CLINIC | Age: 60
End: 2025-03-04
Payer: COMMERCIAL

## 2025-03-04 VITALS
BODY MASS INDEX: 32.2 KG/M2 | WEIGHT: 230 LBS | HEIGHT: 71 IN | DIASTOLIC BLOOD PRESSURE: 105 MMHG | HEART RATE: 72 BPM | SYSTOLIC BLOOD PRESSURE: 185 MMHG

## 2025-03-04 DIAGNOSIS — I48.0 PAROXYSMAL ATRIAL FIBRILLATION: ICD-10-CM

## 2025-03-04 PROCEDURE — 93000 ELECTROCARDIOGRAM COMPLETE: CPT

## 2025-03-04 PROCEDURE — 99214 OFFICE O/P EST MOD 30 MIN: CPT

## 2025-03-04 PROCEDURE — G2211 COMPLEX E/M VISIT ADD ON: CPT | Mod: NC

## 2025-03-10 ENCOUNTER — APPOINTMENT (OUTPATIENT)
Dept: PULMONOLOGY | Facility: CLINIC | Age: 60
End: 2025-03-10
Payer: COMMERCIAL

## 2025-03-10 VITALS
OXYGEN SATURATION: 95 % | SYSTOLIC BLOOD PRESSURE: 128 MMHG | HEART RATE: 75 BPM | BODY MASS INDEX: 31.8 KG/M2 | DIASTOLIC BLOOD PRESSURE: 82 MMHG | WEIGHT: 228 LBS

## 2025-03-10 DIAGNOSIS — G47.33 OBSTRUCTIVE SLEEP APNEA (ADULT) (PEDIATRIC): ICD-10-CM

## 2025-03-10 PROCEDURE — 99213 OFFICE O/P EST LOW 20 MIN: CPT

## 2025-06-17 ENCOUNTER — APPOINTMENT (OUTPATIENT)
Dept: ELECTROPHYSIOLOGY | Facility: CLINIC | Age: 60
End: 2025-06-17
Payer: COMMERCIAL

## 2025-06-17 VITALS
SYSTOLIC BLOOD PRESSURE: 130 MMHG | DIASTOLIC BLOOD PRESSURE: 80 MMHG | BODY MASS INDEX: 31.5 KG/M2 | HEART RATE: 68 BPM | HEIGHT: 71 IN | WEIGHT: 225 LBS

## 2025-06-17 PROCEDURE — 99214 OFFICE O/P EST MOD 30 MIN: CPT

## 2025-06-17 PROCEDURE — G2211 COMPLEX E/M VISIT ADD ON: CPT | Mod: NC

## 2025-06-17 PROCEDURE — 93000 ELECTROCARDIOGRAM COMPLETE: CPT

## 2025-06-17 RX ORDER — CHLORTHALIDONE 25 MG/1
25 TABLET ORAL
Refills: 0 | Status: ACTIVE | COMMUNITY

## 2025-06-19 NOTE — PROGRESS NOTE ADULT - NS_MD_PANP_GEN_ALL_CORE
Ivan Lawrence, was evaluated through a synchronous (real-time) audio-video encounter. The patient (or guardian if applicable) is aware that this is a billable service, which includes applicable co-pays. This Virtual Visit was conducted with patient's (and/or legal guardian's) consent. Patient identification was verified, and a caregiver was present when appropriate.   The patient was located at Home: [id number] [place] [id number]  Provider was located at Facility (Appt Dept): [address] [facility]  [address] [place], [place] [id number]  Confirm you are appropriately licensed, registered, or certified to deliver care in the state where the patient is located as indicated above. If you are not or unsure, please re-schedule the visit: Yes, I confirm.     Ivan Lawrence (:  1992) is a Established patient, presenting virtually for evaluation of the following:      Below is the assessment and plan developed based on review of pertinent history, physical exam, labs, studies, and medications.     Assessment & Plan  Hypersomnia   This is mild.  Sleep study is unremarkable.  Trying to increase his time in bed. No significant concern for narcolepsy or idiopathic hypersomnia.  [place] Sleepiness Scale is 11.  Advised to follow-up with me as needed and increase his time in bed.  No driving if sleepy or drowsy or otherwise impaired.           Return if symptoms worsen or fail to improve.       Subjective results  HPI    Had home sleep study which I reviewed personally and interpreted the results independently.  There is no significant respiratory events.  There is no significant hypoxia.  Trying to increase his time in bed.  Continues to be sleepy every now and then but does not doze off easily.    Review of Systems     12 point ROS has been obtained and negative except as mentioned in HPI.    Physical Exam    Alert and oriented x 3.  Appears well.  No facial rash.  Movement is normal.         On this date 2025 I 
Attending and PA/NP shared services statement (NON-critical care):

## 2025-09-11 ENCOUNTER — APPOINTMENT (OUTPATIENT)
Dept: PULMONOLOGY | Facility: CLINIC | Age: 60
End: 2025-09-11
Payer: COMMERCIAL

## 2025-09-11 DIAGNOSIS — G47.33 OBSTRUCTIVE SLEEP APNEA (ADULT) (PEDIATRIC): ICD-10-CM

## 2025-09-11 PROCEDURE — 99213 OFFICE O/P EST LOW 20 MIN: CPT | Mod: 95
